# Patient Record
(demographics unavailable — no encounter records)

---

## 2025-07-11 NOTE — REASON FOR VISIT
[Initial Consultation] : an initial consultation for [FreeTextEntry2] : aural fullness, muffled hearing

## 2025-07-11 NOTE — PROCEDURE
[FreeTextEntry6] : - Procedure: Bilateral nasal endoscopy Pre-operative Diagnosis: Ear discomfort, hearing loss Post-operative Diagnosis: No blockage of eustachian tube, mild allergic change Anesthesia: Topical lidocaine and topical oxymetazoline   Procedure Details: After topical anesthesia and decongestant, the patient was placed in the supine position. The endoscope was passed along the left nasal floor to the nasopharynx. It was then passed into the region of the middle meatus, middle turbinate, and the sphenoethmoid region. An identical procedure was performed on the right side.   Findings: Mucosa:   + mild inflammation consistent with allergies Nasal septum: midline Discharge:  none Turbinates:  normal Adenoids:   normal Posterior choanae:  normal Eustachian tube orifices:  patent Mucous stranding:  normal Lesions:   Not present

## 2025-07-11 NOTE — HISTORY OF PRESENT ILLNESS
[de-identified] : - 7/11/25 24 y/o F presents with intermittent muffled hearing and aural fullness x 5 months, onset gradually and progressively worsening. Symptoms vary in severity, every few days. Associated discomfort. No tinnitus, otorrhea, or vertigo. Worse after flights, lasting up to several days. Today she is asymptomatic. Able to equalize pressure without significant benefit. In April, treated with nasal spray by Urgent Care with resolution of symptoms for 3-4 weeks. Symptoms have progressively worsened since. Today symptoms are mild. In March, increased nasal congestion, sneezing, itchy/watery eyes starting in March. No prior history of Spring allergies. No known bruxism. No other ear history.  -

## 2025-07-11 NOTE — HISTORY OF PRESENT ILLNESS
[de-identified] : - 7/11/25 26 y/o F presents with intermittent muffled hearing and aural fullness x 5 months, onset gradually and progressively worsening. Symptoms vary in severity, every few days. Associated discomfort. No tinnitus, otorrhea, or vertigo. Worse after flights, lasting up to several days. Today she is asymptomatic. Able to equalize pressure without significant benefit. In April, treated with nasal spray by Urgent Care with resolution of symptoms for 3-4 weeks. Symptoms have progressively worsened since. Today symptoms are mild. In March, increased nasal congestion, sneezing, itchy/watery eyes starting in March. No prior history of Spring allergies. No known bruxism. No other ear history.  -

## 2025-07-11 NOTE — ASSESSMENT
[FreeTextEntry1] : - 7/11/25 24 y/o F with intermittent muffled hearing and aural fullness x 5 months, onset gradually and progressively worsening. Vary in severity, every few days. Today she is asymptomatic. On exam, otoscopy was normal and nasal endoscopy noting mild allergic change without eustachian tube blockage. Symptoms are most likely secondary to eustachian tube dysfunction. At this time, I am recommending "fly and dive" acute nasal decongesting instructions as needed for flights. Follow up as needed. If symptoms are becoming persistent, I would then recommend further work up with audiogram/tympanogram.   - Fly and dive instructions PRN flights - Follow up as needed